# Patient Record
Sex: MALE | Race: WHITE | NOT HISPANIC OR LATINO | Employment: OTHER | URBAN - METROPOLITAN AREA
[De-identification: names, ages, dates, MRNs, and addresses within clinical notes are randomized per-mention and may not be internally consistent; named-entity substitution may affect disease eponyms.]

---

## 2021-01-07 ENCOUNTER — TRANSFERRED RECORDS (OUTPATIENT)
Dept: HEALTH INFORMATION MANAGEMENT | Facility: CLINIC | Age: 61
End: 2021-01-07

## 2021-08-02 ENCOUNTER — TRANSFERRED RECORDS (OUTPATIENT)
Dept: HEALTH INFORMATION MANAGEMENT | Facility: CLINIC | Age: 61
End: 2021-08-02

## 2021-08-13 ENCOUNTER — TRANSFERRED RECORDS (OUTPATIENT)
Dept: HEALTH INFORMATION MANAGEMENT | Facility: CLINIC | Age: 61
End: 2021-08-13

## 2021-09-15 ENCOUNTER — TRANSFERRED RECORDS (OUTPATIENT)
Dept: HEALTH INFORMATION MANAGEMENT | Facility: CLINIC | Age: 61
End: 2021-09-15
Payer: COMMERCIAL

## 2021-09-23 ENCOUNTER — TRANSFERRED RECORDS (OUTPATIENT)
Dept: HEALTH INFORMATION MANAGEMENT | Facility: CLINIC | Age: 61
End: 2021-09-23
Payer: COMMERCIAL

## 2021-10-28 ENCOUNTER — MEDICAL CORRESPONDENCE (OUTPATIENT)
Dept: HEALTH INFORMATION MANAGEMENT | Facility: CLINIC | Age: 61
End: 2021-10-28
Payer: COMMERCIAL

## 2021-11-05 ENCOUNTER — TRANSCRIBE ORDERS (OUTPATIENT)
Dept: OTHER | Age: 61
End: 2021-11-05

## 2021-11-05 DIAGNOSIS — R68.89 ACTIVITY INTOLERANCE: Primary | ICD-10-CM

## 2021-11-05 DIAGNOSIS — R68.89 HEAT INTOLERANCE: ICD-10-CM

## 2021-11-26 ENCOUNTER — MEDICAL CORRESPONDENCE (OUTPATIENT)
Dept: HEALTH INFORMATION MANAGEMENT | Facility: CLINIC | Age: 61
End: 2021-11-26
Payer: COMMERCIAL

## 2021-11-30 ENCOUNTER — TRANSCRIBE ORDERS (OUTPATIENT)
Dept: OTHER | Age: 61
End: 2021-11-30
Payer: COMMERCIAL

## 2021-11-30 DIAGNOSIS — R68.89 ACTIVITY INTOLERANCE: Primary | ICD-10-CM

## 2021-11-30 DIAGNOSIS — R68.89 HEAT INTOLERANCE: ICD-10-CM

## 2021-12-20 NOTE — TELEPHONE ENCOUNTER
RECORDS RECEIVED FROM:   DATE RECEIVED:   NOTES STATUS DETAILS   OFFICE NOTE from referring provider    Internal Zulema - referred to neuro for autonomic testing   OFFICE NOTE from other cardiologist    N/A    DISCHARGE SUMMARY from hospital    N/A    DISCHARGE REPORT from the ER   N/A    OPERATIVE REPORT    N/A    MEDICATION LIST   Internal    LABS     BMP   Internal 11-11-20   CBC   Internal 5-24-21   CMP   N/A    Lipids   Internal 11-11-20   TSH   Internal 8-13-21   DIAGNOSTIC PROCEDURES     EKG   N/A    Monitor Reports   N/A    IMAGING (DISC & REPORT)      Echo   N/A    Stress Tests   N/A    Cath   N/A    MRI/MRA   N/A    CT/CTA   N/A

## 2021-12-26 ENCOUNTER — HEALTH MAINTENANCE LETTER (OUTPATIENT)
Age: 61
End: 2021-12-26

## 2022-02-05 ASSESSMENT — ENCOUNTER SYMPTOMS
LOSS OF CONSCIOUSNESS: 0
TREMORS: 0
SPEECH CHANGE: 0
DIZZINESS: 1
HEADACHES: 0
PARALYSIS: 0
MEMORY LOSS: 0
TINGLING: 0
DISTURBANCES IN COORDINATION: 0
SEIZURES: 0
NUMBNESS: 0
WEAKNESS: 0

## 2022-02-09 ENCOUNTER — PRE VISIT (OUTPATIENT)
Dept: CARDIOLOGY | Facility: CLINIC | Age: 62
End: 2022-02-09
Payer: COMMERCIAL

## 2022-02-09 ENCOUNTER — OFFICE VISIT (OUTPATIENT)
Dept: CARDIOLOGY | Facility: CLINIC | Age: 62
End: 2022-02-09
Attending: INTERNAL MEDICINE
Payer: COMMERCIAL

## 2022-02-09 VITALS — OXYGEN SATURATION: 96 % | WEIGHT: 223.6 LBS

## 2022-02-09 DIAGNOSIS — R42 DIZZINESS: Primary | ICD-10-CM

## 2022-02-09 PROCEDURE — 99205 OFFICE O/P NEW HI 60 MIN: CPT | Performed by: INTERNAL MEDICINE

## 2022-02-09 PROCEDURE — G0463 HOSPITAL OUTPT CLINIC VISIT: HCPCS | Mod: 25

## 2022-02-09 PROCEDURE — 93005 ELECTROCARDIOGRAM TRACING: CPT

## 2022-02-09 RX ORDER — SODIUM CHLORIDE 9 MG/ML
INJECTION, SOLUTION INTRAVENOUS CONTINUOUS
Status: CANCELLED | OUTPATIENT
Start: 2022-02-09

## 2022-02-09 RX ORDER — DEXTROAMPHETAMINE SACCHARATE, AMPHETAMINE ASPARTATE, DEXTROAMPHETAMINE SULFATE AND AMPHETAMINE SULFATE 2.5; 2.5; 2.5; 2.5 MG/1; MG/1; MG/1; MG/1
TABLET ORAL
COMMUNITY
Start: 2022-02-07 | End: 2023-05-04

## 2022-02-09 RX ORDER — OXYBUTYNIN CHLORIDE 10 MG/1
10 TABLET, EXTENDED RELEASE ORAL
COMMUNITY
Start: 2019-01-01 | End: 2024-05-07

## 2022-02-09 RX ORDER — PROPRANOLOL HYDROCHLORIDE 120 MG/1
CAPSULE, EXTENDED RELEASE ORAL
COMMUNITY
Start: 2022-01-18 | End: 2022-07-27

## 2022-02-09 RX ORDER — ACETAMINOPHEN 500 MG
500 TABLET ORAL
COMMUNITY

## 2022-02-09 RX ORDER — LIDOCAINE 40 MG/G
CREAM TOPICAL
Status: CANCELLED | OUTPATIENT
Start: 2022-02-09

## 2022-02-09 RX ORDER — CHOLECALCIFEROL (VITAMIN D3) 1250 MCG
CAPSULE ORAL
COMMUNITY
Start: 2016-01-01

## 2022-02-09 RX ORDER — ALUMINUM CHLORIDE 20 %
SOLUTION, NON-ORAL TOPICAL
COMMUNITY
Start: 2021-05-07 | End: 2024-05-07

## 2022-02-09 ASSESSMENT — PAIN SCALES - GENERAL: PAINLEVEL: NO PAIN (0)

## 2022-02-09 NOTE — LETTER
2/9/2022      RE: Jean-Claude Martinez  [address]  [facility] [address]       Dear Colleague,    Thank you for the opportunity to participate in the care of your patient, Jean-Claude Martinez, at the [facility] [place] at [facility]. Please see a copy of my visit note below.    HPI:   Mr. Jean-Claude Martinez is a pleasant 61-year-old retired male who was kindly referred by Dr. Marcia Mart (neurologist) at [facility] neurology [facility].  Mr. Martinez has history of cerebral palsy but has been quite functional throughout his working life.  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  His alf however was triggered by the worsening of symptoms beginning in 2021.    Patient has been known to have heat/cold intolerance and limited activity tolerance for some time but this seemed to become worse last year.  He is also known to have dizziness and imbalance as well as spasticity.  He does have problems with tremor and these do show up on his electrocardiogram today.      He does not report any injurious falls.  He does not have any exertional cardiac symptoms that limits his capabilities.    Based on the worsening of his complaints, autonomic studies were recommended.  Originally he was consulted to the neurology autonomic laboratory at [facility] but due to insurance issues is now referred here.    At today's visit I reviewed his symptoms and explained the rationale for proceeding with autonomic testing at the request of neurology.  Patient understood and voiced agreement to proceed.  He was also curious about his cardiac evaluation.  I did review his electrocardiogram which does show a left axis deviation (left anterior fascicular block) but otherwise no acute abnormalities he does have baseline tremor which impacts interpretation however.  I also suggested that we undertake an echocardiogram to  further assure him that his cardiac status has been adequately evaluated.  He was agreeable    I reviewed his family history.  He has numerous siblings but none have problems similar to his.    PAST MEDICAL HISTORY:  No past medical history on file.    CURRENT MEDICATIONS:  Current Outpatient Medications   Medication Sig Dispense Refill     acetaminophen (TYLENOL) 500 MG tablet Take 500 mg by mouth       aluminum chloride (DRYSOL) 20 % external solution APPLY TOPICALLY TO AFFECTED AREAS OF SKIN EVERY OTHER DAY AS NEEDED.  Indications: Excessive Sweating Disorder       amphetamine-dextroamphetamine (ADDERALL) 10 MG tablet TAKE ONE (1) TABLET BY MOUTH EACH MORNING       cholecalciferol (VITAMIN D3) 1250 mcg (40101 units) capsule        diphenhydrAMINE-acetaminophen (TYLENOL PM)  MG tablet Take 1 tablet by mouth       oxybutynin ER (DITROPAN-XL) 10 MG 24 hr tablet Take 10 mg by mouth       propranolol ER (INDERAL LA) 120 MG 24 hr capsule TAKE ONE CAPSULE BY MOUTH ONCE DAILY         PAST SURGICAL HISTORY:  No past surgical history on file.    ALLERGIES:     Allergies   Allergen Reactions     Penicillins Hives and Rash     RASH         FAMILY HISTORY:  Family history reviewed today-multiple siblings with various problems but none related or pertinent to the patient's \     SOCIAL HISTORY:  Social History     Tobacco Use     Smoking status: Never Smoker     Smokeless tobacco: Never Used   Substance Use Topics     Alcohol use: None     Drug use: None       ROS:   Constitutional: No fever, chills, or sweats. Weight stable.   ENT: No visual disturbance, ear ache, epistaxis, sore throat.   Cardiovascular: As per HPI.   Respiratory: No cough, hemoptysis.    GI: No nausea, vomiting, .   : No hematuria.   Integument: Negative.   Psychiatric: Negative.   Hematologic:   no easy bleeding.  Neuro: Negative.   Endocrinology: Significant heat or cold intolerance   Musculoskeletal: No myalgia.    Exam:  Wt 101.4 kg (223 lb 9.6  oz)   SpO2 96%   GENERAL APPEARANCE: healthy, alert and no distress  HEENT: no icterus, opathy, no asymmetry, masses, or scars, thyroid normal to palpation and no bruits, JVP not elevated  RESPIRATORY: lungs clear to auscultation - no rales, rhonchi or wheezes, no use of accessory muscles, no retractions, respirations are unlabored, normal respiratory rate  CARDIOVASCULAR: regular rhythm, normal S1 with physiologic split S2, no S3 or S4 and no murmur, click or rub, precordium quiet with normal PMI.  ABDOMEN: soft, non tender, without hepatosplenomegaly, no masses palpable, bowel sounds normal,   EXTREMITIES: peripheral pulses normal, no edema, no bruits  NEURO: alert and oriented to person/place/time, normal speech, gait and affect  SKIN: no ecchymoses, no rashes    Labs:  CBC RESULTS:   No results found for: WBC, RBC, HGB, HCT, MCV, MCH, MCHC, RDW, PLT    BMP RESULTS:  No results found for: NA, POTASSIUM, CHLORIDE, CO2, ANIONGAP, GLC, BUN, CR, GFRESTIMATED, GFRESTBLACK, MERY     INR RESULTS:  No results found for: INR    Procedures:  PULMONARY FUNCTION TESTS:   No flowsheet data found.      ECHOCARDIOGRAM:   No results found for this or any previous visit (from the past 8760 hour(s)).      Assessment and Plan:   1.  Neurologic complaints likely in part related to remote history of cerebral palsy.  Principal current complaint is exertional intolerance, heat/cold intolerance  2.  Cardiac status unknown: Cardiac echo to be undertaken    Plan  1.  Arrange for autonomic studies as requested by neurology  2.  Echocardiogram  3.  Virtual follow-up after above testing complete    Total elapsed time today with chart review, visit and documentation 60 minutes    I very much appreciated the opportun today with CV Fellow and resident. Please do not hesitate to contact my office if you have any questions or concerns.      Paulo Crow MD  Cardiac Arrhythmia Service  [facility]  [phone]    CC  [facility]  EMILY DAY

## 2022-02-09 NOTE — PATIENT INSTRUCTIONS
You were seen in the Electrophysiology Clinic today by: Dr Crow    Plan:     Labs/Tests Needed:    Autonomic testing    echocardiogram      Follow up visit:    2-3 months after testing with Dr Crow      Further Instructions:         Tilt Table/Autonomic study     You will need to undergo a COVID-19 PCR swab test within 4 days of procedure. You will receive a phone call with more information. If you do not hear from the COVID scheduling team, please call: [phone] to schedule.      Visitor Policy: One visitor who must remain in pre-op room throughout entire procedure, or leave the facility.     Below are instructions, if you have questions please contact our office.     1. You should arrive at the [facility] at _______  ([address], [place]: [phone]).    2. Report to the [facility] waiting room. Your procedure will be done in the Catheterization Lab.     3. Wear comfortable clothing. (sweat/yoga pants, t-shirt). Please allow 3-4 hours for this procedure to be completed.     4. Do not eat or drink ANYTHING for 6 hours prior to arrival.     5. The morning of your procedure, you may take any scheduled medications with a SIP of water- unless you were instructed differently by your physician.   Do not take any vitamins, minerals or herbal supplements.     6. You may drive yourself to and from this procedure.       Your Care Team:   Cardiology   Telephone Number     Nurse Line  Elizabeth Rivers RN  [phone]     For scheduling appts or procedures:    Melissa Peralta   [phone]   For the Device Clinic (Pacemakers, ICDs, Loop Recorders)    During business hours: [phone]  After business hours:   [phone]- select option 4 and ask for job code [id number].     On-call cardiologist for after hours or on weekends: [phone], option #4, and ask to speak to the on-call cardiologist.     Cardiovascular Clinic:   [address] [place] [address]. [place] [address]      As  always, Thank you for trusting us with your health care needs!

## 2022-02-09 NOTE — NURSING NOTE
Chief Complaint   Patient presents with     New Patient     NEW- Activity intolerance, heat intolerance - recommend autonomic testing- from [facility] Dr Marcia Mart NEuro     Vitals were taken, medications reconciled, and EKG was performed.    Blake Gunderson, EMT  3:31 PM

## 2022-02-09 NOTE — PROGRESS NOTES
HPI:   Mr. Jean-Claude Martinez is a pleasant 61-year-old retired male who was kindly referred by Dr. Marcia Mart (neurologist) at [facility] neurology clinic.  Mr. Martinez has history of cerebral palsy but has been quite functional throughout his working life.  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  His half-way however was triggered by the worsening of symptoms beginning in 2021.    Patient has been known to have heat/cold intolerance and limited activity tolerance for some time but this seemed to become worse last year.  He is also known to have dizziness and imbalance as well as spasticity.  He does have problems with tremor and these do show up on his electrocardiogram today.      He does not report any injurious falls.  He does not have any exertional cardiac symptoms that limits his capabilities.    Based on the worsening of his complaints, autonomic studies were recommended.  Originally he was consulted to the neurology autonomic laboratory at [facility] but due to insurance issues is now referred here.    At today's visit I reviewed his symptoms and explained the rationale for proceeding with autonomic testing at the request of neurology.  Patient understood and voiced agreement to proceed.  He was also curious about his cardiac evaluation.  I did review his electrocardiogram which does show a left axis deviation (left anterior fascicular block) but otherwise no acute abnormalities he does have baseline tremor which impacts interpretation however.  I also suggested that we undertake an echocardiogram to further assure him that his cardiac status has been adequately evaluated.  He was agreeable    I reviewed his family history.  He has numerous siblings but none have problems similar to his.    PAST MEDICAL HISTORY:  No past medical history on file.    CURRENT MEDICATIONS:  Current Outpatient Medications   Medication Sig Dispense Refill      acetaminophen (TYLENOL) 500 MG tablet Take 500 mg by mouth       aluminum chloride (DRYSOL) 20 % external solution APPLY TOPICALLY TO AFFECTED AREAS OF SKIN EVERY OTHER DAY AS NEEDED.  Indications: Excessive Sweating Disorder       amphetamine-dextroamphetamine (ADDERALL) 10 MG tablet TAKE ONE (1) TABLET BY MOUTH EACH MORNING       cholecalciferol (VITAMIN D3) 1250 mcg (01418 units) capsule        diphenhydrAMINE-acetaminophen (TYLENOL PM)  MG tablet Take 1 tablet by mouth       oxybutynin ER (DITROPAN-XL) 10 MG 24 hr tablet Take 10 mg by mouth       propranolol ER (INDERAL LA) 120 MG 24 hr capsule TAKE ONE CAPSULE BY MOUTH ONCE DAILY         PAST SURGICAL HISTORY:  No past surgical history on file.    ALLERGIES:     Allergies   Allergen Reactions     Penicillins Hives and Rash     RASH         FAMILY HISTORY:  Family history reviewed today-multiple siblings with various problems but none related or pertinent to the patient's \     SOCIAL HISTORY:  Social History     Tobacco Use     Smoking status: Never Smoker     Smokeless tobacco: Never Used   Substance Use Topics     Alcohol use: None     Drug use: None       ROS:   Constitutional: No fever, chills, or sweats. Weight stable.   ENT: No visual disturbance, ear ache, epistaxis, sore throat.   Cardiovascular: As per HPI.   Respiratory: No cough, hemoptysis.    GI: No nausea, vomiting, .   : No hematuria.   Integument: Negative.   Psychiatric: Negative.   Hematologic:   no easy bleeding.  Neuro: Negative.   Endocrinology: Significant heat or cold intolerance   Musculoskeletal: No myalgia.    Exam:  Wt 101.4 kg (223 lb 9.6 oz)   SpO2 96%   GENERAL APPEARANCE: healthy, alert and no distress  HEENT: no icterus, opathy, no asymmetry, masses, or scars, thyroid normal to palpation and no bruits, JVP not elevated  RESPIRATORY: lungs clear to auscultation - no rales, rhonchi or wheezes, no use of accessory muscles, no retractions, respirations are unlabored, normal  respiratory rate  CARDIOVASCULAR: regular rhythm, normal S1 with physiologic split S2, no S3 or S4 and no murmur, click or rub, precordium quiet with normal PMI.  ABDOMEN: soft, non tender, without hepatosplenomegaly, no masses palpable, bowel sounds normal,   EXTREMITIES: peripheral pulses normal, no edema, no bruits  NEURO: alert and oriented to person/place/time, normal speech, gait and affect  SKIN: no ecchymoses, no rashes    Labs:  CBC RESULTS:   No results found for: WBC, RBC, HGB, HCT, MCV, MCH, MCHC, RDW, PLT    BMP RESULTS:  No results found for: NA, POTASSIUM, CHLORIDE, CO2, ANIONGAP, GLC, BUN, CR, GFRESTIMATED, GFRESTBLACK, MERY     INR RESULTS:  No results found for: INR    Procedures:  PULMONARY FUNCTION TESTS:   No flowsheet data found.      ECHOCARDIOGRAM:   No results found for this or any previous visit (from the past 8760 hour(s)).      Assessment and Plan:   1.  Neurologic complaints likely in part related to remote history of cerebral palsy.  Principal current complaint is exertional intolerance, heat/cold intolerance  2.  Cardiac status unknown: Cardiac echo to be undertaken    Plan  1.  Arrange for autonomic studies as requested by neurology  2.  Echocardiogram  3.  Virtual follow-up after above testing complete    Total elapsed time today with chart review, visit and documentation 60 minutes    I very much appreciated the opportun today with CV Fellow and resident. Please do not hesitate to contact my office if you have any questions or concerns.      Paulo Crow MD  Cardiac Arrhythmia Service  [facility]  [phone]      EMILY ZAMORA    Answers for HPI/ROS submitted by the patient on 2/5/2022  General Symptoms: No  Skin Symptoms: No  HENT Symptoms: No  EYE SYMPTOMS: No  HEART SYMPTOMS: No  LUNG SYMPTOMS: No  INTESTINAL SYMPTOMS: No  URINARY SYMPTOMS: No  REPRODUCTIVE SYMPTOMS: No  SKELETAL SYMPTOMS: No  BLOOD SYMPTOMS: No  NERVOUS SYSTEM SYMPTOMS: Yes  MENTAL  HEALTH SYMPTOMS: No  Trouble with coordination: No  Dizziness or trouble with balance: Yes  Fainting or black-out spells: No  Memory loss: No  Headache: No  Seizures: No  Speech problems: No  Tingling: No  Tremor: No  Weakness: No  Difficulty walking: No  Paralysis: No  Numbness: No

## 2022-02-10 ENCOUNTER — TELEPHONE (OUTPATIENT)
Dept: CARDIOLOGY | Facility: CLINIC | Age: 62
End: 2022-02-10
Payer: COMMERCIAL

## 2022-02-10 LAB
ATRIAL RATE - MUSE: 67 BPM
DIASTOLIC BLOOD PRESSURE - MUSE: NORMAL MMHG
INTERPRETATION ECG - MUSE: NORMAL
P AXIS - MUSE: NORMAL DEGREES
PR INTERVAL - MUSE: NORMAL MS
QRS DURATION - MUSE: 108 MS
QT - MUSE: 426 MS
QTC - MUSE: 450 MS
R AXIS - MUSE: -69 DEGREES
SYSTOLIC BLOOD PRESSURE - MUSE: NORMAL MMHG
T AXIS - MUSE: 63 DEGREES
VENTRICULAR RATE- MUSE: 67 BPM

## 2022-02-10 NOTE — TELEPHONE ENCOUNTER
Called pt, pt inquires about EKG results that are noted on the EKG printout. I advised pt that Dr Crow did note that because of patients tremors, it had impacted the interpretation slightly but overall Dr Crow said that it was fine and there weren't any acute abnormalities seen.   Patient verbalized understanding, and denied any further questions. Elizabeth Rivers RN on 2/10/2022 at 3:39 PM

## 2022-02-10 NOTE — TELEPHONE ENCOUNTER
"Patient called the EP scheduling line looking for Elizabeth Rivers RN to discuss \"results from yesterday\". He asks that I have Elizabeth call him back today.     Melissa Peralta  Periop Electrophysiology   [phone]    "

## 2022-02-20 DIAGNOSIS — Z11.59 ENCOUNTER FOR SCREENING FOR OTHER VIRAL DISEASES: Primary | ICD-10-CM

## 2022-03-10 ENCOUNTER — ANCILLARY PROCEDURE (OUTPATIENT)
Dept: CARDIOLOGY | Facility: CLINIC | Age: 62
End: 2022-03-10
Payer: COMMERCIAL

## 2022-03-10 DIAGNOSIS — R42 DIZZINESS: ICD-10-CM

## 2022-03-10 LAB — LVEF ECHO: NORMAL

## 2022-03-10 PROCEDURE — 93306 TTE W/DOPPLER COMPLETE: CPT | Performed by: INTERNAL MEDICINE

## 2022-03-21 ENCOUNTER — TELEPHONE (OUTPATIENT)
Dept: CARDIOLOGY | Facility: CLINIC | Age: 62
End: 2022-03-21
Payer: COMMERCIAL

## 2022-03-21 NOTE — TELEPHONE ENCOUNTER
[facility] Call Center    Phone Message    May a detailed message be left on voicemail: yes     Reason for Call: Other: Don calling with a question regarding his upcoming tilt table test.  No specifics were given when asked.  Please call him back to discuss at your earliest convenience      Action Taken: Message routed to:  [facility] Cardiology    Travel Screening: Not Applicable

## 2022-03-21 NOTE — TELEPHONE ENCOUNTER
Returned call to pt, pt inquiring if there will be any issues with the tilt table test because he has tremors related to his parkinsons and it can interfere with EKGs. I advised him that they will have him on a monitor during the testing and that Dr Crow is aware of his tremors. Pt also asks about results of echocardiogram. I advised him that Dr Crow can also review these results at his procedure appointment. Patient verbalizes understandings and will call with further questions or concerns.     Elizabeth Rivers RN on 3/21/2022 at 4:42 PM

## 2022-03-28 ENCOUNTER — LAB (OUTPATIENT)
Dept: LAB | Facility: CLINIC | Age: 62
End: 2022-03-28
Payer: COMMERCIAL

## 2022-03-28 DIAGNOSIS — Z11.59 ENCOUNTER FOR SCREENING FOR OTHER VIRAL DISEASES: ICD-10-CM

## 2022-03-28 PROCEDURE — U0005 INFEC AGEN DETEC AMPLI PROBE: HCPCS

## 2022-03-28 PROCEDURE — U0003 INFECTIOUS AGENT DETECTION BY NUCLEIC ACID (DNA OR RNA); SEVERE ACUTE RESPIRATORY SYNDROME CORONAVIRUS 2 (SARS-COV-2) (CORONAVIRUS DISEASE [COVID-19]), AMPLIFIED PROBE TECHNIQUE, MAKING USE OF HIGH THROUGHPUT TECHNOLOGIES AS DESCRIBED BY CMS-2020-01-R: HCPCS

## 2022-03-29 ENCOUNTER — TELEPHONE (OUTPATIENT)
Dept: CARDIOLOGY | Facility: CLINIC | Age: 62
End: 2022-03-29
Payer: COMMERCIAL

## 2022-03-29 LAB — SARS-COV-2 RNA RESP QL NAA+PROBE: NEGATIVE

## 2022-03-30 ENCOUNTER — HOSPITAL ENCOUNTER (OUTPATIENT)
Facility: CLINIC | Age: 62
Discharge: HOME | End: 2022-03-30
Attending: INTERNAL MEDICINE | Admitting: INTERNAL MEDICINE
Payer: COMMERCIAL

## 2022-03-30 ENCOUNTER — APPOINTMENT (OUTPATIENT)
Dept: MEDSURG UNIT | Facility: CLINIC | Age: 62
End: 2022-03-30
Attending: INTERNAL MEDICINE
Payer: COMMERCIAL

## 2022-03-30 VITALS
DIASTOLIC BLOOD PRESSURE: 90 MMHG | WEIGHT: 220 LBS | HEIGHT: 68 IN | HEART RATE: 63 BPM | OXYGEN SATURATION: 99 % | BODY MASS INDEX: 33.34 KG/M2 | TEMPERATURE: 98.1 F | RESPIRATION RATE: 18 BRPM | SYSTOLIC BLOOD PRESSURE: 150 MMHG

## 2022-03-30 DIAGNOSIS — R42 DIZZINESS: ICD-10-CM

## 2022-03-30 PROCEDURE — 999N000132 HC STATISTIC PP CARE STAGE 1

## 2022-03-30 PROCEDURE — 999N000142 HC STATISTIC PROCEDURE PREP ONLY

## 2022-03-30 PROCEDURE — 95921 AUTONOMIC NRV PARASYM INERVJ: CPT | Performed by: INTERNAL MEDICINE

## 2022-03-30 PROCEDURE — 999N000127 HC STATISTIC PERIPHERAL IV START W US GUIDANCE

## 2022-03-30 PROCEDURE — 258N000003 HC RX IP 258 OP 636: Performed by: INTERNAL MEDICINE

## 2022-03-30 PROCEDURE — 93660 TILT TABLE EVALUATION: CPT | Performed by: INTERNAL MEDICINE

## 2022-03-30 PROCEDURE — 272N000001 HC OR GENERAL SUPPLY STERILE: Performed by: INTERNAL MEDICINE

## 2022-03-30 PROCEDURE — 93660 TILT TABLE EVALUATION: CPT | Mod: 26 | Performed by: INTERNAL MEDICINE

## 2022-03-30 PROCEDURE — 95921 AUTONOMIC NRV PARASYM INERVJ: CPT | Mod: 26 | Performed by: INTERNAL MEDICINE

## 2022-03-30 RX ORDER — LIDOCAINE 40 MG/G
CREAM TOPICAL
Status: DISCONTINUED | OUTPATIENT
Start: 2022-03-30 | End: 2022-03-30 | Stop reason: HOSPADM

## 2022-03-30 RX ORDER — SODIUM CHLORIDE 9 MG/ML
INJECTION, SOLUTION INTRAVENOUS CONTINUOUS
Status: DISCONTINUED | OUTPATIENT
Start: 2022-03-30 | End: 2022-03-30 | Stop reason: HOSPADM

## 2022-03-30 RX ADMIN — SODIUM CHLORIDE 500 ML: 9 INJECTION, SOLUTION INTRAVENOUS at 07:55

## 2022-03-30 NOTE — PROGRESS NOTES
Pt arrived to 2A s/p tilt table study. VSS. Pt denies pain. Awaiting Dr. Crow to discuss results. Pt tolerating PO intake.

## 2022-03-30 NOTE — PROGRESS NOTES
Pre procedure complete. VSS; BP taken manually d/t pt's tremors. Awaiting vascular RN for PIV insertion. Pt denies pain. Pt appropriately NPO. Vitor (friend) will transport home post procedure.

## 2022-03-30 NOTE — DISCHARGE INSTRUCTIONS
[facility] HEALTH  DISCHARGE INSTRUCTIONS FOR   TILT TABLE STUDY    Date: March 30, 2022    Plan:  Please record blood pressure measurements each day and bring record to clinic.    Cardiovascular Clinic:  [address], [place], [address]    Your Care Team:        EP Cardiology      Telephone Number         Dr. Paulo Farrell                [phone]         Glendy Calero, RN    Flora Rodriguez, RN              [phone]         For Scheduling Appointments or              Procedures:      Melissa Carmichael               [phone]       As always, Thank you for trusting us with your health care needs!

## 2022-03-30 NOTE — H&P
"    Cardiac Electrophysiology H&P      TTT and autonomic testing     HPI:    60 y/o gentleman here for a scheduled TTT and autonomic test.    Hx significant for:  Cerebral Palsy    Main symptoms that have been progressively worse since 2021 reported are: dizziness, imbalance, spasticity, heat/cold intolerance and exertional intolerance. No significant changes since he was seen on 2/9/22.     Past Medical History:    No past medical history on file.    Past Surgical  History:    No past surgical history on file.    Family History:    No family history on file.    Social History:      Social History     Socioeconomic History     Marital status:      Spouse name: Not on file     Number of children: Not on file     Years of education: Not on file     Highest education level: Not on file   Occupational History     Not on file   Tobacco Use     Smoking status: Never Smoker     Smokeless tobacco: Never Used   Substance and Sexual Activity     Alcohol use: Not on file     Drug use: Not on file     Sexual activity: Not on file   Other Topics Concern     Not on file   Social History Narrative     Not on file     Social Determinants of Health     Financial Resource Strain: Not on file   Food Insecurity: Not on file   Transportation Needs: Not on file   Physical Activity: Not on file   Stress: Not on file   Social Connections: Not on file   Intimate Partner Violence: Not on file   Housing Stability: Not on file       ROS:    A complete review of systems is negative except as noted above in the HPI.    Physical Exam:   Vitals: /60 (BP Location: Left arm)   Pulse 72   Temp 98.1  F (36.7  C) (Oral)   Resp 18   Ht 1.727 m (5' 8\")   Wt 99.8 kg (220 lb)   SpO2 100%   BMI 33.45 kg/m    Gen: NAD  Neck: No JVP  Chest: CTAB  Cardiovascular: RRR, no M/R/G   Abd: soft, NT/ND  Extremities: no LE edema         Relevant labs, imaging, medications and procedures were reviewed.    COVID neg    TTE: LVEF 55%, grade II " diastolic dysfunction    EKG: NSR, LAFB    Assessment and Recommendations:   60 y/o gentleman referred by neurology for a tilt table test and autonomic test.    Hx significant for:  Cerebral Palsy    Seen in clinic on 2/9/22 for further evaluation of: dizziness, imbalance, spasticity, heat/cold intolerance and exertional intolerance. Informed consent obtained. Questions answered. EKG and TTE results listed above. Will go ahead and perform tests.    I discussed the patient with Dr. Crow.    Gideon Gamez MD   Cardiac electrophysiology fellow    I very much appreciated the opportunity to see and assess Mr Jean-Claude Martinez in the hospital with CVEP Fellow Dr Gamez. The note above summarizes my findings and current recommendations.  Please do not hesitate to contact my office if you have any questions or concerns.      Paulo Crow MD  Cardiac Arrhythmia Service  [facility]  [phone]

## 2022-03-30 NOTE — PROGRESS NOTES
Discharge instructions reviewed and pt verbalized understanding. Dr. Crow at bedside to discuss results. PIV removed. Pt discharged home with friend.    no

## 2022-04-26 ENCOUNTER — TRANSFERRED RECORDS (OUTPATIENT)
Dept: HEALTH INFORMATION MANAGEMENT | Facility: CLINIC | Age: 62
End: 2022-04-26
Payer: COMMERCIAL

## 2022-05-03 ENCOUNTER — TELEPHONE (OUTPATIENT)
Dept: CARDIOLOGY | Facility: CLINIC | Age: 62
End: 2022-05-03
Payer: COMMERCIAL

## 2022-05-03 NOTE — TELEPHONE ENCOUNTER
Called pt, pt states he had tilt table test on 3/30/22 and during the test, the BP machine was reading high blood pressures, pt thinks this could be inaccurate. Pt says that Dr Crow had wanted him to keep track of his BPs and report in with them. He states that Chelsea from [facility] faxed in a report last week, I advised him that we have nothing on file and that he should call Chelsea and have her refax it to us at [phone].    Pt still taking Propanolol 120mg daily, pt states he is feeling fine. He notes that his BP has been pretty stable from what he knows, he thinks its been around 130s/80s and is usually taken around 9am a few times a week.    Advised him I will keep a look out for the fax, and after I talk with Dr Crow about the readings, I will be in touch with him. Patient verbalizes understandings and will call with further questions or concerns.    Elizabeth Rivers RN on 5/3/2022 at 12:44 PM

## 2022-05-03 NOTE — TELEPHONE ENCOUNTER
[facility] Call Center    Phone Message    May a detailed message be left on voicemail: yes     Reason for Call: Other: Don calling to find out how long he should be taking his Bp readings.  Please call him back to discuss     Action Taken: Message routed to:  [facility] Cardiology    Travel Screening: Not Applicable

## 2022-05-09 NOTE — TELEPHONE ENCOUNTER
Paulo Crow MD McDonald, Dana L RN  Caller: Unspecified (6 days ago,  8:58 AM)  Elizabeth   Ok   We can have him stop   Occas BP once /week would be reasonable going forward   DB     Called pt, advised him of message above. Patient verbalizes understandings and will call with further questions or concerns.   .Elizabeth Rivers, RN on 5/9/2022 at 1:32 PM

## 2022-07-27 ENCOUNTER — OFFICE VISIT (OUTPATIENT)
Dept: CARDIOLOGY | Facility: CLINIC | Age: 62
End: 2022-07-27
Attending: NURSE PRACTITIONER
Payer: COMMERCIAL

## 2022-07-27 VITALS
BODY MASS INDEX: 34.21 KG/M2 | WEIGHT: 225.7 LBS | SYSTOLIC BLOOD PRESSURE: 144 MMHG | HEART RATE: 65 BPM | HEIGHT: 68 IN | DIASTOLIC BLOOD PRESSURE: 90 MMHG | OXYGEN SATURATION: 97 %

## 2022-07-27 DIAGNOSIS — R25.1 TREMOR: Primary | ICD-10-CM

## 2022-07-27 DIAGNOSIS — I95.1 ORTHOSTATIC HYPOTENSION: ICD-10-CM

## 2022-07-27 PROCEDURE — G0463 HOSPITAL OUTPT CLINIC VISIT: HCPCS

## 2022-07-27 PROCEDURE — 99213 OFFICE O/P EST LOW 20 MIN: CPT | Performed by: NURSE PRACTITIONER

## 2022-07-27 RX ORDER — ASPIRIN 81 MG/1
81 TABLET, CHEWABLE ORAL DAILY
COMMUNITY

## 2022-07-27 RX ORDER — PROPRANOLOL HCL 60 MG
60 CAPSULE, EXTENDED RELEASE 24HR ORAL DAILY
Qty: 90 CAPSULE | Refills: 3 | Status: SHIPPED | OUTPATIENT
Start: 2022-07-27 | End: 2023-05-04

## 2022-07-27 RX ORDER — ROSUVASTATIN CALCIUM 10 MG/1
10 TABLET, COATED ORAL
COMMUNITY
Start: 2022-06-27

## 2022-07-27 ASSESSMENT — PAIN SCALES - GENERAL: PAINLEVEL: NO PAIN (0)

## 2022-07-27 NOTE — PATIENT INSTRUCTIONS
You were seen in the Electrophysiology Clinic today by: Chaparrita Olvera NP    Plan:   Medication Changes:   REDUCE- Propranolol to 60mg daily      Follow up visit:   1 year with Dr Crow or Chaparrita Olvera NP        Your Care Team:  EP Cardiology   Telephone Number     Nurse Line  Elizabeth Rivers RN  [phone]     For scheduling appts:   Kelli    For procedures:    Melissa Peralta   [phone]   For the Device Clinic (Pacemakers, ICDs, Loop Recorders)    During business hours: [phone]  After business hours:   [phone]- select option 4 and ask for job code [id number].     On-call cardiologist for after hours or on weekends: [phone], option #4, and ask to speak to the on-call cardiologist.     Cardiovascular Clinic:   [address] [place] [address]. [place] [address]      As always, Thank you for trusting us with your health care needs!

## 2022-07-27 NOTE — NURSING NOTE
Chief Complaint   Patient presents with     Follow Up     3 mo post Tilt Table test - DB pt  meds- propranolol       Vitals were taken and medications reconciled.    Olaf Avilez, EMT  10:05 AM

## 2022-07-27 NOTE — LETTER
7/27/2022      RE: Jean-Claude Martinez  [address]  [facility] [address]       Dear Colleague,    Thank you for the opportunity to participate in the care of your patient, Jean-Claude Martinez, at the [facility] [place] [facility]. Please see a copy of my visit note below.        ELECTROPHYSIOLOGY CLINIC VISIT    Assessment/Recommendations   Assessment/Plan:    Mr. Martinez is a 62 year old male who has a past medical history significant cerebral palsy, autonomic dysfunction, heat/cold intolerance.      - Decrease Propanolol to 60 mg daily to help with dizziness symptoms. Will check in 1 month to see how symptoms are with decreased dosing.   - Use compression shorts (athletic compression shorts)  - Increase hydration with goal to take in 64 oz of water/electrolyte fluids per day. Recommend drinking 8-10oz. Mix 5 oz water with 5 oz pedialyte upon waking prior to rising out of bed each morning or after long naps.   - Eat six small meals per day with focus on foods low in carbohydrates and low in gluten.  - Room humidifier during sleeping.   - Liberalize salt intake  - Change positions carefully and slowly and maintain safe environment. Take particular care when getting out of bed at night and should have walker to use at bedside.   -Standing training and supine isometric exercises.       Follow up in 1 year or sooner if need arises.        History of Present Illness/Subjective    Mr. Jean-Claude Martinez is a 62 year old male who comes in today for EP follow-up of autonomic dysfunction.    Mr. Martinez is a 62 year old male who has a past medical history significant cerebral palsy, autonomic dysfunction, heat/cold intolerance.    EP Visit 2/9/22: He  has history of cerebral palsy but has been quite functional throughout his working life.  Patient has been known to have heat/cold intolerance and limited activity tolerance for some time but this seemed  to become worse last year.  He is also known to have dizziness and imbalance as well as spasticity.  He does have problems with tremor as well for which he has been on propanolol.  Main symptoms that have been progressively worse since 2021 reported are: dizziness, imbalance, spasticity, heat/cold intolerance and exertional intolerance. No significant changes since he was seen on 2/9/22. Decision was made to get an echo and pursue tilt table testing at this visit.     He presents today for follow up. He had tilt table testing on 3/30/22 showed minimal chronotropic response to all maneuvers and abnormal valsalva and carotid massage.He brings home BP readings today which show adequate BP control.  He reports feeling at baseline. He continues to struggle with the same symptoms. He denies chest discomfort, palpitations, abdominal fullness/bloating or peripheral edema, paroxysmal nocturnal dyspnea, orthopnea,  pre-syncope, or syncope. Current cardiac medications include: Propranolol.         I have reviewed and updated the patient's Past Medical History, Social History, Family History and Medication List.     Cardiographics (Personally Reviewed) :   3/10/22 Echo:   Interpretation Summary     Global and regional left ventricular function is normal with an EF of 55-60%.  Mild concentric wall thickening consistent with left ventricular hypertrophy  is present.  Grade II or moderate diastolic dysfunction.  Global right ventricular function is normal.  Mild aortic insufficiency is present.  Mild dilatation of the aorta is present.  IVC diameter <2.1 cm collapsing >50% with sniff suggests a normal RA pressure  of 3 mmHg.  No pericardial effusion is present.  There is no prior study for direct comparison.    3/30/22 Tilt:  Autonomic testing is abnormal. Patient had minimal chronotropic response to all maneuvers (not likely due to his beta-blocker that had not been completely held).  Valsalva, Respir sinus arrhyth, carotid  "massage were all very abnormal and marked by chronotropic incompetence   Symptoms were not necessarily associated with hypotension or even relative hypotension. He exhibits hypertension that needs confirmation in the clinic and if present may be better treated without bet-blocker       Physical Examination   BP (!) 144/90 (BP Location: Right arm, Patient Position: Chair, Cuff Size: Adult Large)   Pulse 65   Ht 1.72 m (5' 7.72\")   Wt 102.4 kg (225 lb 11.2 oz)   SpO2 97%   BMI 34.61 kg/m    Wt Readings from Last 3 Encounters:   03/30/22 99.8 kg (220 lb)   02/09/22 101.4 kg (223 lb 9.6 oz)     General Appearance:   Alert, well-appearing and in no acute distress.   HEENT: Atraumatic, normocephalic. PERRL.  MMM.   Chest/Lungs:   Respirations unlabored.  Lungs are clear to auscultation.   Cardiovascular:   Regular rate and rhythm.  S1/S2. No murmur.    Abdomen:  Soft, nontender, nondistended.   Extremities: No cyanosis or clubbing. No edema.    Musculoskeletal: Moves all extremities.   Skin: Warm, dry, intact.    Neurologic: Mood and affect are appropriate.  Alert and oriented to person, place, time, and situation.          Medications  Allergies   Current Outpatient Medications   Medication Sig Dispense Refill     acetaminophen (TYLENOL) 500 MG tablet Take 500 mg by mouth       aluminum chloride (DRYSOL) 20 % external solution APPLY TOPICALLY TO AFFECTED AREAS OF SKIN EVERY OTHER DAY AS NEEDED.  Indications: Excessive Sweating Disorder       amphetamine-dextroamphetamine (ADDERALL) 10 MG tablet TAKE ONE (1) TABLET BY MOUTH EACH MORNING       cholecalciferol (VITAMIN D3) 1250 mcg (30072 units) capsule        diphenhydrAMINE-acetaminophen (TYLENOL PM)  MG tablet Take 1 tablet by mouth       oxybutynin ER (DITROPAN-XL) 10 MG 24 hr tablet Take 10 mg by mouth       propranolol ER (INDERAL LA) 120 MG 24 hr capsule TAKE ONE CAPSULE BY MOUTH ONCE DAILY      Allergies   Allergen Reactions     Penicillins Hives and Rash "     RASH           Lab Results (Personally Reviewed)    Chemistry/lipid CBC Cardiac Enzymes/BNP/TSH/INR   No results found for: CREATININE, BUN, NA, CO2  No results found for: CR    No results found for: CHOL, HDL, LDL, CHOLHDL   No results found for: WBC, HGB, HCT, MCV, PLT No results found for: CKTOTAL, CKMB, TROPONINI, BNP, TSH, INR     The patient states understanding and is agreeable with the plan.   CROW Scott CNP  Electrophysiology Consult Service  Pager: [phone]

## 2022-07-27 NOTE — PROGRESS NOTES
ELECTROPHYSIOLOGY CLINIC VISIT    Assessment/Recommendations   Assessment/Plan:    Mr. Martinez is a 62 year old male who has a past medical history significant cerebral palsy, autonomic dysfunction, heat/cold intolerance.      - Decrease Propanolol to 60 mg daily to help with dizziness symptoms. Will check in 1 month to see how symptoms are with decreased dosing.   - Use compression shorts (athletic compression shorts)  - Increase hydration with goal to take in 64 oz of water/electrolyte fluids per day. Recommend drinking 8-10oz. Mix 5 oz water with 5 oz pedialyte upon waking prior to rising out of bed each morning or after long naps.   - Eat six small meals per day with focus on foods low in carbohydrates and low in gluten.  - Room humidifier during sleeping.   - Liberalize salt intake  - Change positions carefully and slowly and maintain safe environment. Take particular care when getting out of bed at night and should have walker to use at bedside.   -Standing training and supine isometric exercises.       Follow up in 1 year or sooner if need arises.        History of Present Illness/Subjective    Mr. Jean-Claude Martinez is a 62 year old male who comes in today for EP follow-up of autonomic dysfunction.    Mr. Martinez is a 62 year old male who has a past medical history significant cerebral palsy, autonomic dysfunction, heat/cold intolerance.    EP Visit 2/9/22: He  has history of cerebral palsy but has been quite functional throughout his working life.  Patient has been known to have heat/cold intolerance and limited activity tolerance for some time but this seemed to become worse last year.  He is also known to have dizziness and imbalance as well as spasticity.  He does have problems with tremor as well for which he has been on propanolol.  Main symptoms that have been progressively worse since 2021 reported are: dizziness, imbalance, spasticity, heat/cold intolerance and exertional intolerance. No  significant changes since he was seen on 2/9/22. Decision was made to get an echo and pursue tilt table testing at this visit.     He presents today for follow up. He had tilt table testing on 3/30/22 showed minimal chronotropic response to all maneuvers and abnormal valsalva and carotid massage.He brings home BP readings today which show adequate BP control.  He reports feeling at baseline. He continues to struggle with the same symptoms. He denies chest discomfort, palpitations, abdominal fullness/bloating or peripheral edema, paroxysmal nocturnal dyspnea, orthopnea,  pre-syncope, or syncope. Current cardiac medications include: Propranolol.         I have reviewed and updated the patient's Past Medical History, Social History, Family History and Medication List.     Cardiographics (Personally Reviewed) :   3/10/22 Echo:   Interpretation Summary     Global and regional left ventricular function is normal with an EF of 55-60%.  Mild concentric wall thickening consistent with left ventricular hypertrophy  is present.  Grade II or moderate diastolic dysfunction.  Global right ventricular function is normal.  Mild aortic insufficiency is present.  Mild dilatation of the aorta is present.  IVC diameter <2.1 cm collapsing >50% with sniff suggests a normal RA pressure  of 3 mmHg.  No pericardial effusion is present.  There is no prior study for direct comparison.    3/30/22 Tilt:  Autonomic testing is abnormal. Patient had minimal chronotropic response to all maneuvers (not likely due to his beta-blocker that had not been completely held).  Valsalva, Respir sinus arrhyth, carotid massage were all very abnormal and marked by chronotropic incompetence   Symptoms were not necessarily associated with hypotension or even relative hypotension. He exhibits hypertension that needs confirmation in the clinic and if present may be better treated without bet-blocker       Physical Examination   BP (!) 144/90 (BP Location: Right  "arm, Patient Position: Chair, Cuff Size: Adult Large)   Pulse 65   Ht 1.72 m (5' 7.72\")   Wt 102.4 kg (225 lb 11.2 oz)   SpO2 97%   BMI 34.61 kg/m    Wt Readings from Last 3 Encounters:   03/30/22 99.8 kg (220 lb)   02/09/22 101.4 kg (223 lb 9.6 oz)     General Appearance:   Alert, well-appearing and in no acute distress.   HEENT: Atraumatic, normocephalic. PERRL.  MMM.   Chest/Lungs:   Respirations unlabored.  Lungs are clear to auscultation.   Cardiovascular:   Regular rate and rhythm.  S1/S2. No murmur.    Abdomen:  Soft, nontender, nondistended.   Extremities: No cyanosis or clubbing. No edema.    Musculoskeletal: Moves all extremities.   Skin: Warm, dry, intact.    Neurologic: Mood and affect are appropriate.  Alert and oriented to person, place, time, and situation.          Medications  Allergies   Current Outpatient Medications   Medication Sig Dispense Refill     acetaminophen (TYLENOL) 500 MG tablet Take 500 mg by mouth       aluminum chloride (DRYSOL) 20 % external solution APPLY TOPICALLY TO AFFECTED AREAS OF SKIN EVERY OTHER DAY AS NEEDED.  Indications: Excessive Sweating Disorder       amphetamine-dextroamphetamine (ADDERALL) 10 MG tablet TAKE ONE (1) TABLET BY MOUTH EACH MORNING       cholecalciferol (VITAMIN D3) 1250 mcg (82846 units) capsule        diphenhydrAMINE-acetaminophen (TYLENOL PM)  MG tablet Take 1 tablet by mouth       oxybutynin ER (DITROPAN-XL) 10 MG 24 hr tablet Take 10 mg by mouth       propranolol ER (INDERAL LA) 120 MG 24 hr capsule TAKE ONE CAPSULE BY MOUTH ONCE DAILY      Allergies   Allergen Reactions     Penicillins Hives and Rash     RASH           Lab Results (Personally Reviewed)    Chemistry/lipid CBC Cardiac Enzymes/BNP/TSH/INR   No results found for: CREATININE, BUN, NA, CO2  No results found for: CR    No results found for: CHOL, HDL, LDL, CHOLHDL   No results found for: WBC, HGB, HCT, MCV, PLT No results found for: CKTOTAL, CKMB, TROPONINI, BNP, TSH, INR "     The patient states understanding and is agreeable with the plan.   CROW Scott CNP  Electrophysiology Consult Service  Pager: [phone]

## 2022-08-01 ENCOUNTER — TELEPHONE (OUTPATIENT)
Dept: CARDIOLOGY | Facility: CLINIC | Age: 62
End: 2022-08-01

## 2022-08-01 NOTE — TELEPHONE ENCOUNTER
[facility] Call Center    Phone Message    May a detailed message be left on voicemail: yes     Reason for Call: Other: Don called wanting to speak to Dr. Crow's nurse about a letter that was sent to his PCP about a plan of action that was sent. Bryant stated that he knew nothing about that plan of action. Please advise. Thank you.      Action Taken: Message routed to:  Other: Cardio    Travel Screening: Not Applicable

## 2022-08-03 NOTE — TELEPHONE ENCOUNTER
Called and spoke to patient. Reviewed recommendations in Chaparrita's note. Will mail out standing training and isometric exercise packets to patient - EP coordinator notified.

## 2022-08-03 NOTE — TELEPHONE ENCOUNTER
[facility] Call Center    Phone Message    May a detailed message be left on voicemail: no     Reason for Call: Other: Don is calling back to F/U on his call from 8/1/2022, he has had no reply. Please reach out to him at [phone].     Action Taken: Message routed to:  [facility] ([facility]): Cardiology    Travel Screening: Not Applicable

## 2022-10-23 ENCOUNTER — HEALTH MAINTENANCE LETTER (OUTPATIENT)
Age: 62
End: 2022-10-23

## 2023-04-07 ENCOUNTER — TELEPHONE (OUTPATIENT)
Dept: CARDIOLOGY | Facility: CLINIC | Age: 63
End: 2023-04-07
Payer: COMMERCIAL

## 2023-05-04 ENCOUNTER — VIRTUAL VISIT (OUTPATIENT)
Dept: CARDIOLOGY | Facility: CLINIC | Age: 63
End: 2023-05-04
Attending: INTERNAL MEDICINE
Payer: COMMERCIAL

## 2023-05-04 DIAGNOSIS — G90.9 AUTONOMIC DYSFUNCTION: Primary | ICD-10-CM

## 2023-05-04 PROCEDURE — 99214 OFFICE O/P EST MOD 30 MIN: CPT | Mod: VID | Performed by: INTERNAL MEDICINE

## 2023-05-04 RX ORDER — CLONAZEPAM 0.5 MG/1
TABLET ORAL
COMMUNITY
Start: 2023-04-18

## 2023-05-04 NOTE — LETTER
5/4/2023      RE: Jean-Claude Martinez  [address]  [facility] [address]       Dear Colleague,    Thank you for the opportunity to participate in the care of your patient, Jean-Claude Martinez, at the [facility] HEART [facility] [place] [facility]. Please see a copy of my visit note below.    Virtual Visit Details    Type of service:  Video Visit   HPI:   Mr. Jean-Claude Martinez is a pleasant 61-year-old retired male who was initially referred by Dr. Marcia Mart (neurologist) at [facility] neurology [facility].  Mr. Martinez has history of cerebral palsy. Nevertheless, he has been quite functional throughout his working life.  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     At today's clinic visit he has no new cardiovascular complaints.  We did review his echocardiogram from last year (see summary below) and also his autonomic studies from March 2022.    Since his our last visit Bryant has discontinued propranolol and feels much better for that.  He is also no longer taking Klonopin or Adderall.  He has not had any major symptoms and indicates that his only fall in the last year was when he slipped on the ice in his driveway pushing the garbage tin.  It was not a loss of consciousness event.    Overall patient is doing quite well and we agreed not to change his treatment strategy any further for the time being.  I will check in with him in 1 years time but advised him that he could call at any time should symptoms change. Bryant voiced understanding and agreement.    [facility] Autonomic Study 3/2022  Autonomic testing is abnormal. Patient had minimal chronotropic response to all maneuvers (not likely due to his beta-blocker that had not been completely held).  Valsalva, Respir sinus arrhyth, carotid massage were all very abnormal and marked by chronotropic incompetence    Symptoms were not necessarily associated with hypotension or even relative  hypotension    ECHO  Procedure  Echocardiogram with two-dimensional, color and spectral Doppler performed.    Interpretation Summary     Global and regional left ventricular function is normal with an EF of 55-60%.  Mild concentric wall thickening consistent with left ventricular hypertrophy  is present.  Grade II or moderate diastolic dysfunction.  Global right ventricular function is normal.  Mild aortic insufficiency is present.  Mild dilatation of the aorta is present.  IVC diameter <2.1 cm collapsing >50% with sniff suggests a normal RA pressure  of 3 mmHg.  No pericardial effusion is present.  There is no prior study for direct comparison.  PAST MEDICAL HISTORY:  No past medical history on file.    CURRENT MEDICATIONS:  Current Outpatient Medications   Medication Sig Dispense Refill    acetaminophen (TYLENOL) 500 MG tablet Take 500 mg by mouth      aluminum chloride (DRYSOL) 20 % external solution APPLY TOPICALLY TO AFFECTED AREAS OF SKIN EVERY OTHER DAY AS NEEDED.  Indications: Excessive Sweating Disorder      aspirin (ASA) 81 MG chewable tablet Take 81 mg by mouth daily      cholecalciferol (VITAMIN D3) 1250 mcg (65694 units) capsule       clonazePAM (KLONOPIN) 0.5 MG tablet       oxybutynin ER (DITROPAN-XL) 10 MG 24 hr tablet Take 10 mg by mouth      rosuvastatin (CRESTOR) 10 MG tablet Take 10 mg by mouth      amphetamine-dextroamphetamine (ADDERALL) 10 MG tablet TAKE ONE (1) TABLET BY MOUTH EACH MORNING      diphenhydrAMINE-acetaminophen (TYLENOL PM)  MG tablet Take 1 tablet by mouth      propranolol ER (INDERAL LA) 60 MG 24 hr capsule Take 1 capsule (60 mg) by mouth daily 90 capsule 3       PAST SURGICAL HISTORY:  Past Surgical History:   Procedure Laterality Date    EP STUDY TILT TABLE N/A 3/30/2022    Procedure: Tilt Table;  Surgeon: Paulo Crow MD;  Location:  HEART CARDIAC CATH LAB       ALLERGIES:     Allergies   Allergen Reactions    Penicillins Hives and Rash     RASH          FAMILY HISTORY:  No family history on file.      SOCIAL HISTORY:  Social History     Tobacco Use    Smoking status: Never    Smokeless tobacco: Never       ROS:   Constitutional: No fever, chills, or sweats. Weight stable.   ENT: No visual disturbance,.   Cardiovascular: As per HPI.   Respiratory: No cough, hemoptysis.    GI: No nausea, vomiting, .   : No hematuria.   Integument: Negative.   Psychiatric: Negative.   Hematologic:  , no easy bleeding.  Neuro: Negative.   Endocrinology: No significant heat or cold intolerance   Musculoskeletal: No myalgia or other rheumatologic complaint at the present time.    Exam:  There were no vitals taken for this visit.  GENERAL APPEARANCE: healthy, alert and no distress  HEENT: no icterus,  no central cyanosis  NECK: JVP not elevated  RESPIRATORY:  no rales, rhonchi or wheezes, no use of accessory muscles, no retractions, respirations are unlabored, normal respiratory rate  NEURO: alert and oriented to person/place/time, normal speech, gait and affect  SKIN: no ecchymoses, no rashes    Labs:  CBC RESULTS:   No results found for: WBC, RBC, HGB, HCT, MCV, MCH, MCHC, RDW, PLT    BMP RESULTS:  No results found for: NA, POTASSIUM, CHLORIDE, CO2, ANIONGAP, GLC, BUN, CR, GFRESTIMATED, GFRESTBLACK, MERY     INR RESULTS:  No results found for: INR    Procedures:  PULMONARY FUNCTION TESTS:        View : No data to display.                  ECHOCARDIOGRAM:   No results found for this or any previous visit (from the past 8760 hour(s)).      Assessment and Plan:   1.  Autonomic dysfunction-functionally well controlled on minimal medications    Plan  1.  No treatment change  2.  Follow-up visit 1 year or earlier if necessary    Total elapsed time today with chart review, clinic visit and documentation 30 minutes    I very much appreciated the opportunity to see and assess Jean-Claude Martinez in the clinic today. Please do not hesitate to contact my office if you have any questions or  concerns.      Paulo Crow MD  Cardiac Arrhythmia Service  [facility]  [phone]

## 2023-05-04 NOTE — PROGRESS NOTES
Virtual Visit Details    Type of service:  Video Visit   HPI:   Mr. Jean-Claude Martinez is a pleasant 61-year-old retired male who was initially referred by Dr. Marcia Mart (neurologist) at [facility] neurology clinic.  Mr. Martinez has history of cerebral palsy. Nevertheless, he has been quite functional throughout his working life.  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     At today's clinic visit he has no new cardiovascular complaints.  We did review his echocardiogram from last year (see summary below) and also his autonomic studies from March 2022.    Since his our last visit Bryant has discontinued propranolol and feels much better for that.  He is also no longer taking Klonopin or Adderall.  He has not had any major symptoms and indicates that his only fall in the last year was when he slipped on the ice in his driveway pushing the garbage tin.  It was not a loss of consciousness event.    Overall patient is doing quite well and we agreed not to change his treatment strategy any further for the time being.  I will check in with him in 1 years time but advised him that he could call at any time should symptoms change. Don voiced understanding and agreement.    [employer] Autonomic Study 3/2022  Autonomic testing is abnormal. Patient had minimal chronotropic response to all maneuvers (not likely due to his beta-blocker that had not been completely held).  Valsalva, Respir sinus arrhyth, carotid massage were all very abnormal and marked by chronotropic incompetence    Symptoms were not necessarily associated with hypotension or even relative hypotension    ECHO  Procedure  Echocardiogram with two-dimensional, color and spectral Doppler performed.    Interpretation Summary     Global and regional left ventricular function is normal with an EF of 55-60%.  Mild concentric wall thickening consistent with left ventricular hypertrophy  is present.  Grade II or moderate diastolic  dysfunction.  Global right ventricular function is normal.  Mild aortic insufficiency is present.  Mild dilatation of the aorta is present.  IVC diameter <2.1 cm collapsing >50% with sniff suggests a normal RA pressure  of 3 mmHg.  No pericardial effusion is present.  There is no prior study for direct comparison.  PAST MEDICAL HISTORY:  No past medical history on file.    CURRENT MEDICATIONS:  Current Outpatient Medications   Medication Sig Dispense Refill     acetaminophen (TYLENOL) 500 MG tablet Take 500 mg by mouth       aluminum chloride (DRYSOL) 20 % external solution APPLY TOPICALLY TO AFFECTED AREAS OF SKIN EVERY OTHER DAY AS NEEDED.  Indications: Excessive Sweating Disorder       aspirin (ASA) 81 MG chewable tablet Take 81 mg by mouth daily       cholecalciferol (VITAMIN D3) 1250 mcg (40994 units) capsule        clonazePAM (KLONOPIN) 0.5 MG tablet        oxybutynin ER (DITROPAN-XL) 10 MG 24 hr tablet Take 10 mg by mouth       rosuvastatin (CRESTOR) 10 MG tablet Take 10 mg by mouth       amphetamine-dextroamphetamine (ADDERALL) 10 MG tablet TAKE ONE (1) TABLET BY MOUTH EACH MORNING       diphenhydrAMINE-acetaminophen (TYLENOL PM)  MG tablet Take 1 tablet by mouth       propranolol ER (INDERAL LA) 60 MG 24 hr capsule Take 1 capsule (60 mg) by mouth daily 90 capsule 3       PAST SURGICAL HISTORY:  Past Surgical History:   Procedure Laterality Date     EP STUDY TILT TABLE N/A 3/30/2022    Procedure: Tilt Table;  Surgeon: Paulo Crow MD;  Location:  HEART CARDIAC CATH LAB       ALLERGIES:     Allergies   Allergen Reactions     Penicillins Hives and Rash     RASH         FAMILY HISTORY:  No family history on file.      SOCIAL HISTORY:  Social History     Tobacco Use     Smoking status: Never     Smokeless tobacco: Never       ROS:   Constitutional: No fever, chills, or sweats. Weight stable.   ENT: No visual disturbance,.   Cardiovascular: As per HPI.   Respiratory: No cough, hemoptysis.    GI:  No nausea, vomiting, .   : No hematuria.   Integument: Negative.   Psychiatric: Negative.   Hematologic:  , no easy bleeding.  Neuro: Negative.   Endocrinology: No significant heat or cold intolerance   Musculoskeletal: No myalgia or other rheumatologic complaint at the present time.    Exam:  There were no vitals taken for this visit.  GENERAL APPEARANCE: healthy, alert and no distress  HEENT: no icterus,  no central cyanosis  NECK: JVP not elevated  RESPIRATORY:  no rales, rhonchi or wheezes, no use of accessory muscles, no retractions, respirations are unlabored, normal respiratory rate  NEURO: alert and oriented to person/place/time, normal speech, gait and affect  SKIN: no ecchymoses, no rashes    Labs:  CBC RESULTS:   No results found for: WBC, RBC, HGB, HCT, MCV, MCH, MCHC, RDW, PLT    BMP RESULTS:  No results found for: NA, POTASSIUM, CHLORIDE, CO2, ANIONGAP, GLC, BUN, CR, GFRESTIMATED, GFRESTBLACK, MERY     INR RESULTS:  No results found for: INR    Procedures:  PULMONARY FUNCTION TESTS:        View : No data to display.                  ECHOCARDIOGRAM:   No results found for this or any previous visit (from the past 8760 hour(s)).      Assessment and Plan:   1.  Autonomic dysfunction-functionally well controlled on minimal medications    Plan  1.  No treatment change  2.  Follow-up visit 1 year or earlier if necessary    Total elapsed time today with chart review, clinic visit and documentation 30 minutes    I very much appreciated the opportunity to see and assess Jean-Claude Martinez in the clinic today. Please do not hesitate to contact my office if you have any questions or concerns.      Paulo Crow MD  Cardiac Arrhythmia Service  [facility]  [phone]      CC

## 2023-05-04 NOTE — NURSING NOTE
Chief Complaint   Patient presents with     Follow Up     Annual follow up, no updates per pt. Medications/allergies reconciled and reviewed with pt, pt is now taking Klonopin.        Is the patient currently in the state of [place]? YES    Visit mode:VIDEO    If the visit is dropped, the patient can be reconnected by: VIDEO VISIT: Text to cell phone: [phone]    Will anyone else be joining the visit? NO      How would you like to obtain your AVS? MyChart    Are changes needed to the allergy or medication list? NO    Patient denies any changes since echeck-in regarding medication and allergies and states all information entered during echeck-in remains accurate.    Yaritza Lew, Visit Facilitator/MA.

## 2023-05-04 NOTE — PATIENT INSTRUCTIONS
You were seen in the Electrophysiology Clinic today by: Dr Crow    Plan:     Follow up Visit: 1 year      If you have further questions, please utilize [employer] to contact us.     Your Care Team:    EP Cardiology   Telephone Number     Nurse Line  Elizabeth Rivers, RN   Mary Kate Calero RN   [phone]     For scheduling appointments:   Kelli   [phone]   For procedure scheduling:    Melissa Peralta     [phone]   For the Device Clinic (Pacemakers, ICDs, Loop Recorders)    During business hours: [phone]  After business hours:   [phone]- select option 4 and ask for job code [id number].       On-call cardiologist for after hours or on weekends:   [phone], option #4, and ask to speak to the on-call cardiologist.     Cardiovascular Clinic:   [address] [place] [address]. [place] [address]      As always, Thank you for trusting us with your health care needs!

## 2023-05-05 ENCOUNTER — TELEPHONE (OUTPATIENT)
Dept: CARE COORDINATION | Facility: CLINIC | Age: 63
End: 2023-05-05

## 2023-05-05 NOTE — TELEPHONE ENCOUNTER
Defer scheduling, provider template unavailable     Appointment type: Return Cardiology   Provider: Dr. Crow  Return date: 5/4/2024  Specialty phone number: [phone]  Additional appointment(s) needed: n/a   Additonal Notes:     Plan:        Follow up Visit: 1 year

## 2023-09-02 ENCOUNTER — HEALTH MAINTENANCE LETTER (OUTPATIENT)
Age: 63
End: 2023-09-02

## 2023-11-07 ENCOUNTER — TELEPHONE (OUTPATIENT)
Dept: CARDIOLOGY | Facility: CLINIC | Age: 63
End: 2023-11-07
Payer: COMMERCIAL

## 2024-05-07 ENCOUNTER — OFFICE VISIT (OUTPATIENT)
Dept: CARDIOLOGY | Facility: CLINIC | Age: 64
End: 2024-05-07
Attending: INTERNAL MEDICINE
Payer: COMMERCIAL

## 2024-05-07 VITALS — WEIGHT: 230.4 LBS | OXYGEN SATURATION: 96 % | HEART RATE: 81 BPM | BODY MASS INDEX: 35.33 KG/M2

## 2024-05-07 DIAGNOSIS — G90.9 AUTONOMIC DYSFUNCTION: ICD-10-CM

## 2024-05-07 DIAGNOSIS — I95.1 ORTHOSTATIC HYPOTENSION: Primary | ICD-10-CM

## 2024-05-07 PROCEDURE — G0463 HOSPITAL OUTPT CLINIC VISIT: HCPCS | Performed by: INTERNAL MEDICINE

## 2024-05-07 PROCEDURE — 99215 OFFICE O/P EST HI 40 MIN: CPT | Performed by: INTERNAL MEDICINE

## 2024-05-07 RX ORDER — CARBIDOPA AND LEVODOPA 25; 100 MG/1; MG/1
TABLET ORAL
COMMUNITY
Start: 2024-04-23

## 2024-05-07 RX ORDER — MIRABEGRON 50 MG/1
TABLET, FILM COATED, EXTENDED RELEASE ORAL
COMMUNITY

## 2024-05-07 ASSESSMENT — PAIN SCALES - GENERAL: PAINLEVEL: NO PAIN (0)

## 2024-05-07 NOTE — PROGRESS NOTES
HPI:     Mr. Jean-Claude Martinez is a pleasant 61-year-old retired male who was kindly referred by Dr. Marcia Mart (neurologist) at [facility] neurology clinic.  Mr. Martinez has history of cerebral palsy but has been quite functional throughout his working life.      Autonomic testing was undertaken recently at the [facility] and was markedly abnormal but patient's symptoms of dizziness for which she was referred did not seem to correlate with specific findings.  We did not advocate any treatment for the autonomic dysfunction but did note at the time that he was apparently hypertensive.  Subsequently though he did have his blood pressure checked regularly at home with a manual cuff and it was in a more normal range.  At a primary care visit last year his blood pressure was 134/80 and will be remeasured by manual cuff today given his aversion to the automatic cuffs.    Mr. Martinez indicates that over the past year he has been doing quite well and his dizziness is largely resolved.  His tremor seems to be less marked as well.  He continues to follow at [facility] and is being treated with Sinemet although he does not have a documented diagnosis of parkinsonism.    Patient today is without any cardiovascular symptoms.  BP by cuff today 150/85    PAST MEDICAL HISTORY:  No past medical history on file.    CURRENT MEDICATIONS:  Current Outpatient Medications   Medication Sig Dispense Refill    acetaminophen (TYLENOL) 500 MG tablet Take 500 mg by mouth      aspirin (ASA) 81 MG chewable tablet Take 81 mg by mouth daily      carbidopa-levodopa (SINEMET)  MG tablet CARBIDOPA-LEVODOPA  MG TABS      cholecalciferol (VITAMIN D3) 1250 mcg (23557 units) capsule       clonazePAM (KLONOPIN) 0.5 MG tablet       MYRBETRIQ 50 MG 24 hr tablet Take 1 tablet every day by oral route.      rosuvastatin (CRESTOR) 10 MG tablet Take 10 mg by mouth         PAST SURGICAL HISTORY:  Past Surgical History:   Procedure Laterality Date  "   EP STUDY TILT TABLE N/A 3/30/2022    Procedure: Tilt Table;  Surgeon: Paulo Crow MD;  Location:  HEART CARDIAC CATH LAB       ALLERGIES:     Allergies   Allergen Reactions    Penicillins Hives and Rash     RASH         FAMILY HISTORY:  None pertinent    SOCIAL HISTORY:  Social History     Tobacco Use    Smoking status: Never    Smokeless tobacco: Never       ROS:   Constitutional: No fever, chills, or sweats. Weight stable.   ENT: No visual disturbance, ear ache, epistaxis, sore throat.   Cardiovascular: As per HPI.   Respiratory: No cough, hemoptysis.    GI: No nausea, vomiting,    : Prostatism has been a problem and even postoperatively he continues to have urinary frequency.   Integument: Negative.   Psychiatric: Negative.   Hematologic:   no easy bleeding.  Neuro: Negative.   Endocrinology: No significant heat or cold intolerance   Musculoskeletal: No myalgia.    Exam:  Pulse 81   Wt 104.5 kg (230 lb 6.4 oz)   SpO2 96%   BMI 35.33 kg/m    GENERAL APPEARANCE: healthy, alert and no distress  HEENT: no icterus, no central cyanosis  NECK:  JVP not elevated, no bruits  RESPIRATORY: l- no rales, rhonchi or wheezes, no use of accessory muscles, no retractions, respirations are unlabored, normal respiratory rate  CARDIOVASCULAR: regular rhythm, normal S1 with physiologic split S2, no S3 or S4 and no murmur, click or rub.  ABDOMEN: Moderately obese, soft, non tender, EXTREMITIES: peripheral pulses normal, no edema, no bruits  NEURO: No evident tremor today, alert and oriented to person/place/time, normal speech, gait and affect  SKIN: no ecchymoses, no rashes    Labs:  CBC RESULTS:   No results found for: \"WBC\", \"RBC\", \"HGB\", \"HCT\", \"MCV\", \"MCH\", \"MCHC\", \"RDW\", \"PLT\"    BMP RESULTS:  No results found for: \"NA\", \"POTASSIUM\", \"CHLORIDE\", \"CO2\", \"ANIONGAP\", \"GLC\", \"BUN\", \"CR\", \"GFRESTIMATED\", \"GFRESTBLACK\", \"MERY\"     INR RESULTS:  No results found for: \"INR\"    Procedures:  PULMONARY FUNCTION TESTS:     "    No data to display                  ECHOCARDIOGRAM:   No results found for this or any previous visit (from the past 8760 hour(s)).      Assessment and Plan:   1.  Autonomic dysfunction  2.  Tremor which is currently well-controlled on Sinemet, but no definitive diagnosis of parkinsonism  3.  Dizziness improved  4.  Last echocardiogram in 2022 did show some left ventricular wall thickening    Plan  1.  Continue current medications  2.  Schedule for echocardiogram and twelve-lead ECG at patient's convenience in the next month or so--we will review when available  3.    No new cardiovascular issues for treatment  4.  Follow-up 1 year    Total elapsed time today with chart review, clinic visit and documentation 40 minutes    I very much appreciated the opportunity to see and assess Jean-Claude Martinez in the clinic today wall thickening this could all be done together maybe next month recent. Please do not hesitate to contact my office if you have any questions or concerns.      Paulo Crow MD  Cardiac Arrhythmia Service  [facility]  [phone]       CC  SELF, REFERRED

## 2024-05-07 NOTE — LETTER
5/7/2024      RE: Jean-Claude Martinez  [address]  [facility] [address]       Dear Colleague,    Thank you for the opportunity to participate in the care of your patient, Jean-Claude Martinez, at the [facility] at [facility]. Please see a copy of my visit note below.    HPI:     Mr. Jean-Claude Martinez is a pleasant 61-year-old retired male who was kindly referred by Dr. Marcia Mart (neurologist) at [facility] neurology [facility].  Mr. Martinez has history of cerebral palsy but has been quite functional throughout his working life.      Autonomic testing was undertaken recently at the [facility] and was markedly abnormal but patient's symptoms of dizziness for which she was referred did not seem to correlate with specific findings.  We did not advocate any treatment for the autonomic dysfunction but did note at the time that he was apparently hypertensive.  Subsequently though he did have his blood pressure checked regularly at home with a manual cuff and it was in a more normal range.  At a primary care visit last year his blood pressure was 134/80 and will be remeasured by manual cuff today given his aversion to the automatic cuffs.    Mr. Martinez indicates that over the past year he has been doing quite well and his dizziness is largely resolved.  His tremor seems to be less marked as well.  He continues to follow at [facility] and is being treated with Sinemet although he does not have a documented diagnosis of parkinsonism.    Patient today is without any cardiovascular symptoms.  BP by cuff today 150/85    PAST MEDICAL HISTORY:  No past medical history on file.    CURRENT MEDICATIONS:  Current Outpatient Medications   Medication Sig Dispense Refill    acetaminophen (TYLENOL) 500 MG tablet Take 500 mg by mouth      aspirin (ASA) 81 MG chewable tablet Take 81 mg by mouth daily      carbidopa-levodopa (SINEMET)  MG tablet  CARBIDOPA-LEVODOPA  MG TABS      cholecalciferol (VITAMIN D3) 1250 mcg (35191 units) capsule       clonazePAM (KLONOPIN) 0.5 MG tablet       MYRBETRIQ 50 MG 24 hr tablet Take 1 tablet every day by oral route.      rosuvastatin (CRESTOR) 10 MG tablet Take 10 mg by mouth         PAST SURGICAL HISTORY:  Past Surgical History:   Procedure Laterality Date    EP STUDY TILT TABLE N/A 3/30/2022    Procedure: Tilt Table;  Surgeon: Pauol Crow MD;  Location:  HEART CARDIAC CATH LAB       ALLERGIES:     Allergies   Allergen Reactions    Penicillins Hives and Rash     RASH         FAMILY HISTORY:  None pertinent    SOCIAL HISTORY:  Social History     Tobacco Use    Smoking status: Never    Smokeless tobacco: Never       ROS:   Constitutional: No fever, chills, or sweats. Weight stable.   ENT: No visual disturbance, ear ache, epistaxis, sore throat.   Cardiovascular: As per HPI.   Respiratory: No cough, hemoptysis.    GI: No nausea, vomiting,    : Prostatism has been a problem and even postoperatively he continues to have urinary frequency.   Integument: Negative.   Psychiatric: Negative.   Hematologic:   no easy bleeding.  Neuro: Negative.   Endocrinology: No significant heat or cold intolerance   Musculoskeletal: No myalgia.    Exam:  Pulse 81   Wt 104.5 kg (230 lb 6.4 oz)   SpO2 96%   BMI 35.33 kg/m    GENERAL APPEARANCE: healthy, alert and no distress  HEENT: no icterus, no central cyanosis  NECK:  JVP not elevated, no bruits  RESPIRATORY: l- no rales, rhonchi or wheezes, no use of accessory muscles, no retractions, respirations are unlabored, normal respiratory rate  CARDIOVASCULAR: regular rhythm, normal S1 with physiologic split S2, no S3 or S4 and no murmur, click or rub.  ABDOMEN: Moderately obese, soft, non tender, EXTREMITIES: peripheral pulses normal, no edema, no bruits  NEURO: No evident tremor today, alert and oriented to person/place/time, normal speech, gait and affect  SKIN: no ecchymoses,  "no rashes    Labs:  CBC RESULTS:   No results found for: \"WBC\", \"RBC\", \"HGB\", \"HCT\", \"MCV\", \"MCH\", \"MCHC\", \"RDW\", \"PLT\"    BMP RESULTS:  No results found for: \"NA\", \"POTASSIUM\", \"CHLORIDE\", \"CO2\", \"ANIONGAP\", \"GLC\", \"BUN\", \"CR\", \"GFRESTIMATED\", \"GFRESTBLACK\", \"MERY\"     INR RESULTS:  No results found for: \"INR\"    Procedures:  PULMONARY FUNCTION TESTS:        No data to display                  ECHOCARDIOGRAM:   No results found for this or any previous visit (from the past 8760 hour(s)).      Assessment and Plan:   1.  Autonomic dysfunction  2.  Tremor which is currently well-controlled on Sinemet, but no definitive diagnosis of parkinsonism  3.  Dizziness improved  4.  Last echocardiogram in 2022 did show some left ventricular wall thickening    Plan  1.  Continue current medications  2.  Schedule for echocardiogram and twelve-lead ECG at patient's convenience in the next month or so--we will review when available  3.    No new cardiovascular issues for treatment  4.  Follow-up 1 year    Total elapsed time today with chart review, clinic visit and documentation 40 minutes    I very much appreciated the opportunity to see and assess Jean-Claude Martinez in the clinic today wall thickening this could all be done together maybe next month recent. Please do not hesitate to contact my office if you have any questions or concerns.      Paulo Crow MD  Cardiac Arrhythmia Service  [facility]  [phone]       CC  SELF, REFERRED      "

## 2024-05-07 NOTE — NURSING NOTE
Chief Complaint   Patient presents with    Follow Up     Return EP     Vitals were taken and medications reconciled.    Blake Gunderson, EMT  2:31 PM

## 2024-05-07 NOTE — PATIENT INSTRUCTIONS
You were seen in the Electrophysiology Clinic today by: Dr Crow    Plan:     Labs/Tests Needed:  Echocardiogram and EKG - within the next month or so    Follow up Visit:  1 year        If you have further questions, please utilize [employer] to contact us.     Your Care Team:    EP Cardiology   Telephone Number     Nurse Line  Elizabeth Rivers, RN   Mary Kate Calero, RN  Gasper Lloyd RN   [phone]     For scheduling appointments:   Kelli   [phone]   For procedure scheduling:    Melissa Peralta     [phone]   For the Device Clinic (Pacemakers, ICDs, Loop Recorders)    During business hours: [phone]  After business hours:   [phone]- select option 4 and ask for job code [id number].       On-call cardiologist for after hours or on weekends:   [phone], option #4, and ask to speak to the on-call cardiologist.     Cardiovascular Clinic:   [address] [place] [address]. [place] [address]      As always, Thank you for trusting us with your health care needs!

## 2024-06-07 ENCOUNTER — ANCILLARY PROCEDURE (OUTPATIENT)
Dept: CARDIOLOGY | Facility: CLINIC | Age: 64
End: 2024-06-07
Attending: INTERNAL MEDICINE
Payer: COMMERCIAL

## 2024-06-07 DIAGNOSIS — G90.9 AUTONOMIC DYSFUNCTION: ICD-10-CM

## 2024-06-07 DIAGNOSIS — I95.1 ORTHOSTATIC HYPOTENSION: ICD-10-CM

## 2024-06-07 LAB
ATRIAL RATE - MUSE: 73 BPM
DIASTOLIC BLOOD PRESSURE - MUSE: NORMAL MMHG
INTERPRETATION ECG - MUSE: NORMAL
LVEF ECHO: NORMAL
P AXIS - MUSE: 77 DEGREES
PR INTERVAL - MUSE: 170 MS
QRS DURATION - MUSE: 100 MS
QT - MUSE: 404 MS
QTC - MUSE: 445 MS
R AXIS - MUSE: -82 DEGREES
SYSTOLIC BLOOD PRESSURE - MUSE: NORMAL MMHG
T AXIS - MUSE: 48 DEGREES
VENTRICULAR RATE- MUSE: 73 BPM

## 2024-06-07 PROCEDURE — 93306 TTE W/DOPPLER COMPLETE: CPT | Performed by: INTERNAL MEDICINE

## 2024-06-07 PROCEDURE — 99207 PR STATISTIC IV PUSH SINGLE INITIAL SUBSTANCE: CPT | Performed by: INTERNAL MEDICINE

## 2024-06-07 RX ADMIN — Medication 5 ML: at 13:52

## 2024-08-17 ENCOUNTER — HEALTH MAINTENANCE LETTER (OUTPATIENT)
Age: 64
End: 2024-08-17

## 2025-07-17 ENCOUNTER — VIRTUAL VISIT (OUTPATIENT)
Dept: CARDIOLOGY | Facility: CLINIC | Age: 65
End: 2025-07-17
Attending: INTERNAL MEDICINE
Payer: COMMERCIAL

## 2025-07-17 VITALS
DIASTOLIC BLOOD PRESSURE: 84 MMHG | SYSTOLIC BLOOD PRESSURE: 138 MMHG | HEIGHT: 68 IN | BODY MASS INDEX: 34.1 KG/M2 | WEIGHT: 225 LBS

## 2025-07-17 DIAGNOSIS — R42 DIZZINESS: Primary | ICD-10-CM

## 2025-07-17 RX ORDER — ONABOTULINUMTOXINA 100 [USP'U]/1
INJECTION, POWDER, LYOPHILIZED, FOR SOLUTION INTRADERMAL; INTRAMUSCULAR
COMMUNITY
Start: 2025-03-11

## 2025-07-17 ASSESSMENT — PAIN SCALES - GENERAL: PAINLEVEL_OUTOF10: NO PAIN (0)

## 2025-07-17 NOTE — LETTER
7/17/2025      RE: Jean-Claude Martinez  [address]  [facility] [address]       Dear Colleague,    Thank you for the opportunity to participate in the care of your patient, Jean-Claude Martinez, at the [facility] [place] [facility]. Please see a copy of my visit note below.    Virtual Visit Details    Type of service:  Video Visit     HPI:     Is a 65-year-old male who was originally referred by neurology at [facility] for assessment of dizziness.  Mr. Martinez has a history of cerebral palsy but has been very functional.  He is currently retired but undertakes regular errands without any difficulty.  He has no cardiovascular symptoms.  He denies any problems with exertional tolerance or breathlessness or orthopnea.    The original problem for which we were asked to see Mr. Martinez was his dizziness which in testing did not correlate with any cardiovascular changes.  Subsequently it seems to have largely resolved.    At today's visit Bryant has no new cardiovascular related issues.  He continues to follow with neurology.  Absent any change in his overall condition we will plan to check on him again in about 2 years time.  I indicated to him however that should his symptoms return we would be pleased to see him at any time.    PAST MEDICAL HISTORY:  Cerebral palsy--highly functional  Tremor being treated by Sinemet    CURRENT MEDICATIONS:  Current Outpatient Medications   Medication Sig Dispense Refill     acetaminophen (TYLENOL) 500 MG tablet Take 500 mg by mouth       aspirin (ASA) 81 MG chewable tablet Take 81 mg by mouth daily       botulinum toxin type A (BOTOX) 100 units injection Take 100 units by injection route.       carbidopa-levodopa (SINEMET)  MG tablet CARBIDOPA-LEVODOPA  MG TABS       cholecalciferol (VITAMIN D3) 1250 mcg (52022 units) capsule        clonazePAM (KLONOPIN) 0.5 MG tablet        MYRBETRIQ 50 MG 24 hr tablet  "Take 1 tablet every day by oral route.       rosuvastatin (CRESTOR) 10 MG tablet Take 10 mg by mouth         PAST SURGICAL HISTORY:  Past Surgical History:   Procedure Laterality Date     EP STUDY TILT TABLE N/A 3/30/2022    Procedure: Tilt Table;  Surgeon: Paulo Crow MD;  Location:  HEART CARDIAC CATH LAB       ALLERGIES:     Allergies   Allergen Reactions     Penicillins Hives and Rash     RASH         FAMILY HISTORY:  No pertinent family history    SOCIAL HISTORY:  Social History     Tobacco Use     Smoking status: Never     Smokeless tobacco: Never       ROS:   Constitutional: No fever, chills, or sweats. Weight stable.   ENT: No visual disturbance, ear ache, epistaxis, sore throat.   Cardiovascular: As per HPI.   Respiratory: No cough, hemoptysis.    GI: No nausea, vomiting, hematemesis, melena, or hematochezia.   : No hematuria.   Integument: Negative.   Psychiatric: Negative.   Hematologic:  Easy bruising, no easy bleeding.  Neuro: Negative.   Endocrinology: No significant heat or cold intolerance   Musculoskeletal: No myalgia.    Exam:  /84   Ht 1.727 m (5' 8\")   Wt 102.1 kg (225 lb)   BMI 34.21 kg/m    GENERAL APPEARANCE: healthy, alert and no distress  HEENT: no icterus, , no central cyanosis  NECK: , JVP not elevated  RESPIRATORY:- no rales, rhonchi or wheezes, no use of accessory muscles, no retractions, respirations are unlabored, normal respiratory rate  NEURO: alert and oriented to person/place/time, normal speech,and affect  SKIN: no ecchymoses, no rashes    Labs:  CBC RESULTS:   No results found for: \"WBC\", \"RBC\", \"HGB\", \"HCT\", \"MCV\", \"MCH\", \"MCHC\", \"RDW\", \"PLT\"    BMP RESULTS:  No results found for: \"NA\", \"POTASSIUM\", \"CHLORIDE\", \"CO2\", \"ANIONGAP\", \"GLC\", \"BUN\", \"CR\", \"GFRESTIMATED\", \"GFRESTBLACK\", \"MERY\"     INR RESULTS:  No results found for: \"INR\"    Procedures:  PULMONARY FUNCTION TESTS:        No data to display                  ECHOCARDIOGRAM: "     Procedure  Echocardiogram with two-dimensional, color and spectral Doppler performed.  ______________________________________________________________________________  Interpretation Summary     Global and regional left ventricular function is normal with an EF of 55-60%.  Mild concentric wall thickening consistent with left ventricular hypertrophy  is present.  Grade II or moderate diastolic dysfunction.  Global right ventricular function is normal.  Mild aortic insufficiency is present.  Mild dilatation of the aorta is present.  IVC diameter <2.1 cm collapsing >50% with sniff suggests a normal RA pressure  of 3 mmHg.  No pericardial effusion is present.  There is no prior study for direct comparison.  ________________________________________________________________  Left Ventricle  Global and regional left ventricular function is normal with an EF of 55-60%.  Left ventricular size is normal. Mild concentric wall thickening consistent  with left ventricular hypertrophy is present. Grade II or moderate diastolic  dysfunction.    Assessment and Plan:   1.  Dizziness--largely resolved  2.  No new cardiovascular symptoms    Plan  1.  No change in medications  2.  Video follow-up 18 to 24 months    Total elapsed time today with chart review, clinic visit and documentation 30 minutes    Video on 11: 03; off 11: 20  [facility]  Patient at home; clinic [facility] heart    I very much appreciated the opportunity to see and assess Jean-Claude Martinez in the clinic today. Please do not hesitate to contact my office if you have any questions or concerns.      Paulo Crow MD  Cardiac Arrhythmia Service  [facility]  [phone]         PAULO CROW      Please do not hesitate to contact me if you have any questions/concerns.     Sincerely,     Paulo Crow MD

## 2025-07-17 NOTE — PROGRESS NOTES
Virtual Visit Details    Type of service:  Video Visit     HPI:     Is a 65-year-old male who was originally referred by neurology at [facility] for assessment of dizziness.  Mr. Martinez has a history of cerebral palsy but has been very functional.  He is currently retired but undertakes regular errands without any difficulty.  He has no cardiovascular symptoms.  He denies any problems with exertional tolerance or breathlessness or orthopnea.    The original problem for which we were asked to see Mr. Martinez was his dizziness which in testing did not correlate with any cardiovascular changes.  Subsequently it seems to have largely resolved.    At today's visit Bryant has no new cardiovascular related issues.  He continues to follow with neurology.  Absent any change in his overall condition we will plan to check on him again in about 2 years time.  I indicated to him however that should his symptoms return we would be pleased to see him at any time.    PAST MEDICAL HISTORY:  Cerebral palsy--highly functional  Tremor being treated by Sinemet    CURRENT MEDICATIONS:  Current Outpatient Medications   Medication Sig Dispense Refill    acetaminophen (TYLENOL) 500 MG tablet Take 500 mg by mouth      aspirin (ASA) 81 MG chewable tablet Take 81 mg by mouth daily      botulinum toxin type A (BOTOX) 100 units injection Take 100 units by injection route.      carbidopa-levodopa (SINEMET)  MG tablet CARBIDOPA-LEVODOPA  MG TABS      cholecalciferol (VITAMIN D3) 1250 mcg (71993 units) capsule       clonazePAM (KLONOPIN) 0.5 MG tablet       MYRBETRIQ 50 MG 24 hr tablet Take 1 tablet every day by oral route.      rosuvastatin (CRESTOR) 10 MG tablet Take 10 mg by mouth         PAST SURGICAL HISTORY:  Past Surgical History:   Procedure Laterality Date    EP STUDY TILT TABLE N/A 3/30/2022    Procedure: Tilt Table;  Surgeon: Paulo Crow MD;  Location:  HEART CARDIAC CATH LAB       ALLERGIES:     Allergies   Allergen  "Reactions    Penicillins Hives and Rash     RASH         FAMILY HISTORY:  No pertinent family history    SOCIAL HISTORY:  Social History     Tobacco Use    Smoking status: Never    Smokeless tobacco: Never       ROS:   Constitutional: No fever, chills, or sweats. Weight stable.   ENT: No visual disturbance, ear ache, epistaxis, sore throat.   Cardiovascular: As per HPI.   Respiratory: No cough, hemoptysis.    GI: No nausea, vomiting, hematemesis, melena, or hematochezia.   : No hematuria.   Integument: Negative.   Psychiatric: Negative.   Hematologic:  Easy bruising, no easy bleeding.  Neuro: Negative.   Endocrinology: No significant heat or cold intolerance   Musculoskeletal: No myalgia.    Exam:  /84   Ht 1.727 m (5' 8\")   Wt 102.1 kg (225 lb)   BMI 34.21 kg/m    GENERAL APPEARANCE: healthy, alert and no distress  HEENT: no icterus, , no central cyanosis  NECK: , JVP not elevated  RESPIRATORY:- no rales, rhonchi or wheezes, no use of accessory muscles, no retractions, respirations are unlabored, normal respiratory rate  NEURO: alert and oriented to person/place/time, normal speech,and affect  SKIN: no ecchymoses, no rashes    Labs:  CBC RESULTS:   No results found for: \"WBC\", \"RBC\", \"HGB\", \"HCT\", \"MCV\", \"MCH\", \"MCHC\", \"RDW\", \"PLT\"    BMP RESULTS:  No results found for: \"NA\", \"POTASSIUM\", \"CHLORIDE\", \"CO2\", \"ANIONGAP\", \"GLC\", \"BUN\", \"CR\", \"GFRESTIMATED\", \"GFRESTBLACK\", \"MERY\"     INR RESULTS:  No results found for: \"INR\"    Procedures:  PULMONARY FUNCTION TESTS:        No data to display                  ECHOCARDIOGRAM:     Procedure  Echocardiogram with two-dimensional, color and spectral Doppler performed.  ______________________________________________________________________________  Interpretation Summary     Global and regional left ventricular function is normal with an EF of 55-60%.  Mild concentric wall thickening consistent with left ventricular hypertrophy  is present.  Grade II or moderate " diastolic dysfunction.  Global right ventricular function is normal.  Mild aortic insufficiency is present.  Mild dilatation of the aorta is present.  IVC diameter <2.1 cm collapsing >50% with sniff suggests a normal RA pressure  of 3 mmHg.  No pericardial effusion is present.  There is no prior study for direct comparison.  ________________________________________________________________  Left Ventricle  Global and regional left ventricular function is normal with an EF of 55-60%.  Left ventricular size is normal. Mild concentric wall thickening consistent  with left ventricular hypertrophy is present. Grade II or moderate diastolic  dysfunction.    Assessment and Plan:   1.  Dizziness--largely resolved  2.  No new cardiovascular symptoms    Plan  1.  No change in medications  2.  Video follow-up 18 to 24 months    Total elapsed time today with chart review, clinic visit and documentation 30 minutes    Video on 11: 03; off 11: 20  Platform [employer]  Patient at home; clinic [facility] heart    I very much appreciated the opportunity to see and assess Jean-Claude Martinez in the clinic today. Please do not hesitate to contact my office if you have any questions or concerns.      Paulo Crow MD  Cardiac Arrhythmia Service  [facility]  [phone]       CC  PAULO CROW

## 2025-07-17 NOTE — PATIENT INSTRUCTIONS
Plan:   1.  No change in medications  2.  Video follow-up 18 to 24 months      Your Care Team:  EP Cardiology   Telephone Number     Vito Blair RN [phone]    After business hours: [phone], ask for cardiologist on-call   Non-procedure scheduling:    [facility]   [phone]   Procedure scheduling:    Melissa Peralta   [phone]   Device Clinic (Pacemakers, ICDs, Loop Recorders)    During business hours: [phone]  After business hours:   [phone]- select option 4 and ask for job code [id number].       Cardiovascular Clinic:   [address] [place] [address]. [place] [address]      As always, thank you for trusting us with your health care needs!    If you have further questions, please utilize [employer] to contact us.

## 2025-07-17 NOTE — NURSING NOTE
Current patient location: [address]   [facility] [address]    Is the patient currently in the state of [place]? YES    Visit mode: VIDEO    If the visit is dropped, the patient can be reconnected by:VIDEO VISIT: Text to cell phone:   Telephone Information:   Mobile [phone]       Will anyone else be joining the visit? NO  (If patient encounters technical issues they should call [phone]    Are changes needed to the allergy or medication list? Medication flagged for removal, please review/remove    Patient denies any changes since echeck-in completion and states all information entered during echeck-in remains accurate.    Are refills needed on medications prescribed by this physician? NA    Rooming Documentation:  Questionnaire(s) completed    Reason for visit: RECHECK    JOSUE ContrerasF

## 2025-08-17 ENCOUNTER — HEALTH MAINTENANCE LETTER (OUTPATIENT)
Age: 65
End: 2025-08-17

## (undated) DEVICE — CUFF FINGER CLEARSIGHT LG CSCL

## (undated) RX ORDER — NITROGLYCERIN 0.4 MG/1
TABLET SUBLINGUAL
Status: DISPENSED
Start: 2022-03-30

## (undated) RX ORDER — SODIUM CHLORIDE 9 MG/ML
INJECTION, SOLUTION INTRAVENOUS
Status: DISPENSED
Start: 2022-03-30